# Patient Record
Sex: FEMALE | Race: WHITE | NOT HISPANIC OR LATINO | Employment: OTHER | URBAN - METROPOLITAN AREA
[De-identification: names, ages, dates, MRNs, and addresses within clinical notes are randomized per-mention and may not be internally consistent; named-entity substitution may affect disease eponyms.]

---

## 2021-08-13 ENCOUNTER — LAB VISIT (OUTPATIENT)
Dept: LAB | Facility: HOSPITAL | Age: 81
End: 2021-08-13
Payer: MEDICARE

## 2021-08-13 DIAGNOSIS — E06.5 HYPOTHYROIDISM DUE TO FIBROUS INVASIVE THYROIDITIS: ICD-10-CM

## 2021-08-13 DIAGNOSIS — E55.9 AVITAMINOSIS D: ICD-10-CM

## 2021-08-13 DIAGNOSIS — R53.83 FATIGUE: ICD-10-CM

## 2021-08-13 DIAGNOSIS — I10 ESSENTIAL HYPERTENSION, MALIGNANT: ICD-10-CM

## 2021-08-13 DIAGNOSIS — E78.49 FAMILIAL COMBINED HYPERLIPIDEMIA: ICD-10-CM

## 2021-08-13 DIAGNOSIS — J45.20 INTRINSIC ASTHMA WITHOUT STATUS ASTHMATICUS: ICD-10-CM

## 2021-08-13 DIAGNOSIS — E03.8 HYPOTHYROIDISM DUE TO FIBROUS INVASIVE THYROIDITIS: ICD-10-CM

## 2021-08-13 DIAGNOSIS — E78.2 MIXED HYPERLIPIDEMIA: ICD-10-CM

## 2021-08-13 DIAGNOSIS — Z00.01 ENCOUNTER FOR GENERAL ADULT MEDICAL EXAMINATION WITH ABNORMAL FINDINGS: Primary | ICD-10-CM

## 2021-08-13 DIAGNOSIS — R88.8 NONSPECIFIC ABNORMAL FINDINGS IN BODY FLUID: ICD-10-CM

## 2021-08-13 DIAGNOSIS — R73.09 IMPAIRED GLUCOSE TOLERANCE TEST: ICD-10-CM

## 2021-08-13 LAB
25(OH)D3+25(OH)D2 SERPL-MCNC: 59 NG/ML (ref 30–96)
BASOPHILS # BLD AUTO: 0.03 K/UL (ref 0–0.2)
BASOPHILS NFR BLD: 0.7 % (ref 0–1.9)
DIFFERENTIAL METHOD: NORMAL
EOSINOPHIL # BLD AUTO: 0.1 K/UL (ref 0–0.5)
EOSINOPHIL NFR BLD: 3.1 % (ref 0–8)
ERYTHROCYTE [DISTWIDTH] IN BLOOD BY AUTOMATED COUNT: 14.3 % (ref 11.5–14.5)
HCT VFR BLD AUTO: 39.8 % (ref 37–48.5)
HGB BLD-MCNC: 12.9 G/DL (ref 12–16)
IMM GRANULOCYTES # BLD AUTO: 0.01 K/UL (ref 0–0.04)
IMM GRANULOCYTES NFR BLD AUTO: 0.2 % (ref 0–0.5)
LYMPHOCYTES # BLD AUTO: 1.3 K/UL (ref 1–4.8)
LYMPHOCYTES NFR BLD: 29.1 % (ref 18–48)
MCH RBC QN AUTO: 29.3 PG (ref 27–31)
MCHC RBC AUTO-ENTMCNC: 32.4 G/DL (ref 32–36)
MCV RBC AUTO: 91 FL (ref 82–98)
MONOCYTES # BLD AUTO: 0.6 K/UL (ref 0.3–1)
MONOCYTES NFR BLD: 12.1 % (ref 4–15)
NEUTROPHILS # BLD AUTO: 2.5 K/UL (ref 1.8–7.7)
NEUTROPHILS NFR BLD: 54.8 % (ref 38–73)
NRBC BLD-RTO: 0 /100 WBC
PLATELET # BLD AUTO: 253 K/UL (ref 150–450)
PMV BLD AUTO: 11.4 FL (ref 9.2–12.9)
RBC # BLD AUTO: 4.4 M/UL (ref 4–5.4)
T3FREE SERPL-MCNC: 2.7 PG/ML (ref 2.3–4.2)
T4 FREE SERPL-MCNC: 1.06 NG/DL (ref 0.71–1.51)
TSH SERPL DL<=0.005 MIU/L-ACNC: 1.9 UIU/ML (ref 0.4–4)
VIT B12 SERPL-MCNC: 791 PG/ML (ref 210–950)
WBC # BLD AUTO: 4.53 K/UL (ref 3.9–12.7)

## 2021-08-13 PROCEDURE — 84443 ASSAY THYROID STIM HORMONE: CPT

## 2021-08-13 PROCEDURE — 85025 COMPLETE CBC W/AUTO DIFF WBC: CPT

## 2021-08-13 PROCEDURE — 80061 LIPID PANEL: CPT

## 2021-08-13 PROCEDURE — 83036 HEMOGLOBIN GLYCOSYLATED A1C: CPT

## 2021-08-13 PROCEDURE — 36415 COLL VENOUS BLD VENIPUNCTURE: CPT | Mod: PO

## 2021-08-13 PROCEDURE — 83525 ASSAY OF INSULIN: CPT

## 2021-08-13 PROCEDURE — 80053 COMPREHEN METABOLIC PANEL: CPT

## 2021-08-13 PROCEDURE — 84439 ASSAY OF FREE THYROXINE: CPT

## 2021-08-13 PROCEDURE — 82306 VITAMIN D 25 HYDROXY: CPT

## 2021-08-13 PROCEDURE — 84481 FREE ASSAY (FT-3): CPT

## 2021-08-13 PROCEDURE — 82607 VITAMIN B-12: CPT | Mod: GA

## 2021-08-14 LAB
ESTIMATED AVG GLUCOSE: 111 MG/DL (ref 68–131)
HBA1C MFR BLD: 5.5 % (ref 4–5.6)
INSULIN COLLECTION INTERVAL: NORMAL
INSULIN SERPL-ACNC: 10.1 UU/ML

## 2021-08-15 LAB
ALBUMIN SERPL BCP-MCNC: 3.4 G/DL (ref 3.5–5.2)
ALP SERPL-CCNC: 66 U/L (ref 55–135)
ALT SERPL W/O P-5'-P-CCNC: 27 U/L (ref 10–44)
ANION GAP SERPL CALC-SCNC: 13 MMOL/L (ref 8–16)
AST SERPL-CCNC: 25 U/L (ref 10–40)
BILIRUB SERPL-MCNC: 0.6 MG/DL (ref 0.1–1)
BUN SERPL-MCNC: 10 MG/DL (ref 8–23)
CALCIUM SERPL-MCNC: 9.1 MG/DL (ref 8.7–10.5)
CHLORIDE SERPL-SCNC: 107 MMOL/L (ref 95–110)
CHOLEST SERPL-MCNC: 188 MG/DL (ref 120–199)
CHOLEST/HDLC SERPL: 3.5 {RATIO} (ref 2–5)
CO2 SERPL-SCNC: 24 MMOL/L (ref 23–29)
CREAT SERPL-MCNC: 0.9 MG/DL (ref 0.5–1.4)
EST. GFR  (AFRICAN AMERICAN): >60 ML/MIN/1.73 M^2
EST. GFR  (NON AFRICAN AMERICAN): >60 ML/MIN/1.73 M^2
GLUCOSE SERPL-MCNC: 90 MG/DL (ref 70–110)
HDLC SERPL-MCNC: 54 MG/DL (ref 40–75)
HDLC SERPL: 28.7 % (ref 20–50)
LDLC SERPL CALC-MCNC: 103.2 MG/DL (ref 63–159)
NONHDLC SERPL-MCNC: 134 MG/DL
POTASSIUM SERPL-SCNC: 3.6 MMOL/L (ref 3.5–5.1)
PROT SERPL-MCNC: 7.2 G/DL (ref 6–8.4)
SODIUM SERPL-SCNC: 144 MMOL/L (ref 136–145)
TRIGL SERPL-MCNC: 154 MG/DL (ref 30–150)

## 2021-08-25 PROBLEM — J41.0 SIMPLE CHRONIC BRONCHITIS: Status: ACTIVE | Noted: 2021-08-25

## 2021-08-25 PROBLEM — K63.5 POLYP OF COLON: Status: ACTIVE | Noted: 2021-08-25

## 2021-08-25 PROBLEM — D51.9 ANEMIA DUE TO VITAMIN B12 DEFICIENCY: Status: ACTIVE | Noted: 2021-08-25

## 2021-08-25 PROBLEM — R73.09 IMPAIRED GLUCOSE TOLERANCE TEST: Status: ACTIVE | Noted: 2021-08-25

## 2021-08-25 PROBLEM — K44.9 HIATAL HERNIA: Status: ACTIVE | Noted: 2021-08-25

## 2021-08-25 PROBLEM — E78.1 PURE HYPERGLYCERIDEMIA: Status: ACTIVE | Noted: 2021-08-25

## 2021-08-25 PROBLEM — K21.9 GASTROESOPHAGEAL REFLUX DISEASE WITHOUT ESOPHAGITIS: Status: ACTIVE | Noted: 2021-08-25

## 2021-08-25 PROBLEM — E03.8 OTHER SPECIFIED HYPOTHYROIDISM: Status: ACTIVE | Noted: 2021-08-25

## 2021-08-25 PROBLEM — Z85.828 HISTORY OF BASAL CELL CANCER: Status: ACTIVE | Noted: 2021-08-25

## 2021-08-25 PROBLEM — Z12.31 OTHER SCREENING MAMMOGRAM: Status: ACTIVE | Noted: 2021-08-25

## 2021-08-25 PROBLEM — I47.29 VENTRICULAR TACHYCARDIA (PAROXYSMAL): Status: ACTIVE | Noted: 2021-08-25

## 2021-08-25 PROBLEM — Z13.39 SCREENING FOR ALCOHOLISM: Status: ACTIVE | Noted: 2021-08-25

## 2023-08-30 PROBLEM — E78.00: Status: ACTIVE | Noted: 2018-08-16

## 2023-08-30 PROBLEM — I10 HYPERTENSIVE DISORDER: Status: ACTIVE | Noted: 2018-08-16

## 2023-08-30 PROBLEM — Z95.0 CARDIAC PACEMAKER IN SITU: Status: ACTIVE | Noted: 2018-09-26

## 2023-08-30 PROBLEM — R55 SYNCOPE AND COLLAPSE: Status: ACTIVE | Noted: 2018-08-16

## 2023-08-30 PROBLEM — I49.5 SICK SINUS SYNDROME: Status: ACTIVE | Noted: 2019-07-09

## 2024-01-17 ENCOUNTER — TELEPHONE (OUTPATIENT)
Dept: RHEUMATOLOGY | Facility: CLINIC | Age: 84
End: 2024-01-17
Payer: MEDICARE

## 2024-01-17 NOTE — TELEPHONE ENCOUNTER
----- Message from Isha Das sent at 1/10/2024 12:32 PM CST -----  Regarding: appointment  Contact: patient  Type:  Sooner Appointment Request    Caller is requesting a sooner appointment.  Caller declined first available appointment listed below.  Caller will not accept being placed on the waitlist and is requesting a message be sent to doctor.    Name of Caller:  patient  When is the first available appointment?    Symptoms:  abnormal labs  Would the patient rather a call back or a response via [facility]? call  Best Call Back Number: [phone]    Additional Information:  Please call patient to schedule.  Thanks!

## 2024-07-03 ENCOUNTER — OFFICE VISIT (OUTPATIENT)
Dept: OPTOMETRY | Facility: CLINIC | Age: 84
End: 2024-07-03
Payer: MEDICARE

## 2024-07-03 DIAGNOSIS — H25.13 NUCLEAR SCLEROSIS OF BOTH EYES: Primary | ICD-10-CM

## 2024-07-03 DIAGNOSIS — H52.7 REFRACTIVE ERROR: ICD-10-CM

## 2024-07-03 DIAGNOSIS — H35.372 EPIRETINAL MEMBRANE (ERM) OF LEFT EYE: ICD-10-CM

## 2024-07-03 PROCEDURE — 3288F FALL RISK ASSESSMENT DOCD: CPT | Mod: CPTII,S$GLB,, | Performed by: OPTOMETRIST

## 2024-07-03 PROCEDURE — 1160F RVW MEDS BY RX/DR IN RCRD: CPT | Mod: CPTII,S$GLB,, | Performed by: OPTOMETRIST

## 2024-07-03 PROCEDURE — 92015 DETERMINE REFRACTIVE STATE: CPT | Mod: S$GLB,,, | Performed by: OPTOMETRIST

## 2024-07-03 PROCEDURE — 92004 COMPRE OPH EXAM NEW PT 1/>: CPT | Mod: S$GLB,,, | Performed by: OPTOMETRIST

## 2024-07-03 PROCEDURE — 1126F AMNT PAIN NOTED NONE PRSNT: CPT | Mod: CPTII,S$GLB,, | Performed by: OPTOMETRIST

## 2024-07-03 PROCEDURE — 1159F MED LIST DOCD IN RCRD: CPT | Mod: CPTII,S$GLB,, | Performed by: OPTOMETRIST

## 2024-07-03 PROCEDURE — 1101F PT FALLS ASSESS-DOCD LE1/YR: CPT | Mod: CPTII,S$GLB,, | Performed by: OPTOMETRIST

## 2024-07-03 PROCEDURE — 99999 PR PBB SHADOW E&M-EST. PATIENT-LVL II: CPT | Mod: PBBFAC,,, | Performed by: OPTOMETRIST

## 2024-07-03 NOTE — PROGRESS NOTES
HPI    Ocular health visit [facility] 2023 (Hang Barraza)    Pt complains of blurred va, pt uses otc readers to aid near but notices   difficulty driving at night. Pt denies flashes but notices floaters.  Last edited by Nellie Ballard on 7/3/2024  2:15 PM.            Assessment /Plan     For exam results, see Encounter Report.    Nuclear sclerosis of both eyes    Epiretinal membrane (ERM) of left eye    Refractive error      Causing blur and night vision issues, Educated pt on presence of cataracts and effects on vision. No surgery at this time. Recheck in one year.  2. Seen prev by Fazal, offered surgery, pt chose to defer, recheck in 1 year  3. New Spectacle Rx given, discussed different options for glasses. RTC 1 year routine eye exam.

## 2024-08-13 ENCOUNTER — PATIENT MESSAGE (OUTPATIENT)
Dept: INFECTIOUS DISEASES | Facility: CLINIC | Age: 84
End: 2024-08-13
Payer: MEDICARE

## 2024-08-14 ENCOUNTER — OFFICE VISIT (OUTPATIENT)
Dept: INFECTIOUS DISEASES | Facility: CLINIC | Age: 84
End: 2024-08-14

## 2024-08-14 ENCOUNTER — CLINICAL SUPPORT (OUTPATIENT)
Dept: INFECTIOUS DISEASES | Facility: CLINIC | Age: 84
End: 2024-08-14
Payer: MEDICARE

## 2024-08-14 VITALS
HEART RATE: 70 BPM | DIASTOLIC BLOOD PRESSURE: 77 MMHG | SYSTOLIC BLOOD PRESSURE: 158 MMHG | TEMPERATURE: 99 F | WEIGHT: 158.75 LBS | BODY MASS INDEX: 25.51 KG/M2 | HEIGHT: 66 IN

## 2024-08-14 DIAGNOSIS — Z71.84 TRAVEL ADVICE ENCOUNTER: ICD-10-CM

## 2024-08-14 DIAGNOSIS — Z71.84 TRAVEL ADVICE ENCOUNTER: Primary | ICD-10-CM

## 2024-08-14 DIAGNOSIS — Z29.89 NEED FOR MALARIA PROPHYLAXIS: Primary | ICD-10-CM

## 2024-08-14 PROCEDURE — 90471 IMMUNIZATION ADMIN: CPT | Mod: ,,, | Performed by: INTERNAL MEDICINE

## 2024-08-14 PROCEDURE — 90717 YELLOW FEVER VACCINE SUBQ: CPT | Mod: ,,, | Performed by: INTERNAL MEDICINE

## 2024-08-14 PROCEDURE — 99999 PR PBB SHADOW E&M-EST. PATIENT-LVL IV: CPT | Mod: PBBFAC,,, | Performed by: PHYSICIAN ASSISTANT

## 2024-08-14 PROCEDURE — 99402 PREV MED CNSL INDIV APPRX 30: CPT | Mod: ,,, | Performed by: PHYSICIAN ASSISTANT

## 2024-08-14 RX ORDER — ATOVAQUONE AND PROGUANIL HYDROCHLORIDE 250; 100 MG/1; MG/1
1 TABLET, FILM COATED ORAL DAILY
Qty: 55 TABLET | Refills: 0 | Status: SHIPPED | OUTPATIENT
Start: 2024-08-14

## 2024-08-14 RX ORDER — AZITHROMYCIN 500 MG/1
500 TABLET, FILM COATED ORAL DAILY
Qty: 3 TABLET | Refills: 0 | Status: SHIPPED | OUTPATIENT
Start: 2024-08-14

## 2024-08-14 NOTE — PROGRESS NOTES
Travel Consult    Chief Complaint   Patient presents with    Follow-up    Travel Consult       Heena Narvaez presents today for pretravel consultation.  The patient will be traveling to [place] on 9/4/24. The patient will be at this destination for 39 days. The purpose of this trip is vacation. She will be visiting [place], [place], [place], [place] and [place].  The patient will be going on [employer] and lodging in [place]ented [place]. Patient denies recent fevers, chills or infectious illnesses. Denies hx of splenctomy. No history of immunosuppression.       Past Medical History:   Diagnosis Date    Cancer     nose    GERD (gastroesophageal reflux disease)     High cholesterol     History of heart block     Dr. Santoro    Hypertension     Mild intermittent asthma     Personal history of latent tuberculosis infection     Thyroid disease        Review of patient's allergies indicates:   Allergen Reactions    Losartan Swelling       Immunization History   Administered Date(s) Administered    COVID-19, MRNA, LN-S, PF (MODERNA FULL 0.5 ML DOSE) 03/31/2021, 04/29/2021    Hepatitis A, Pediatric/Adolescent, 2 Dose 10/26/2015, 11/10/2016    Hepatitis B, Adult 01/22/2003, 02/20/2003, 07/28/2003    Influenza - Trivalent (ADULT) 09/20/2012    Pneumococcal Conjugate - 13 Valent 05/25/2018    Pneumococcal Polysaccharide - 23 Valent 03/31/2011    Td (ADULT) 05/23/1995, 03/31/2011       ASSESSMENT: Pre-Travel clinic assessment    PLAN:  The Patient was provided with an extensive travel guidance packet which provides travel information specific to the patients itinerary.     The patient's immunization history was reviewed and, based on the patient's itinerary, immunizations were ordered.     -Infectious Disease risks:       Mosquito Borne pathogens:  Reviewed basic mosquito avoidance precautions including wearing long sleeve clothing and insect repellant. The patient was instructed to purchase insect repellent containing DEET or Picardin  and apply according to repellent label instructions. Malarone was prescribed for malaria prophylaxis and possible side effects were reviewed. Discussed the Yellow fever vaccine and potential side effects were reviewed. Due to patient's advanced age, I offered patient a Yellow Fever waiver in lieu of the vaccine. Patient declined the waiver and opted for the vaccination. She voiced understanding of the risks of vaccination.       Food Borne pathogens:  Reviewed basic hand, food and water sanitation precautions.  Patient instructed to take hand  on their trip. The patient is up to date on the Hepatitis A vaccine. Oral typhoid capsule will be sent to her pharmacy. The patient was instructed to purchase Imodium over the counter to take in case diarrhea (without blood or fever) develops.  Azithromycin was ordered for treatment if severe or bloody diarrhea develops and the patient was instructed on use and possible side effects.      Blood Borne Pathogens: Patient has received the hepatitis B vaccination series.       Routine:  Patient will get the Tdap vaccine at her local pharmacy, along with the Influenza and Covid-19 vaccines if available prior to travel.     Environmental risks:   The patient's medical history was reviewed and the patient was counseled on:  Precautions to minimize risk/exposure to crime and motor vehicle accidents  Precautions against development of DVT during flight  Precautions to prevent exposure to rabies and seek treatment for possible exposures  Precautions against sun exposure  Personal and travel safety  Dietary precautions    The patient was instructed to contact us if problems develop after travel.    I have spent 30 minutes with the patient, all of which was face to face with greater than 50% spent counseling.

## 2024-10-14 DIAGNOSIS — U07.1 COVID-19 VIRUS DETECTED: ICD-10-CM
